# Patient Record
Sex: FEMALE
[De-identification: names, ages, dates, MRNs, and addresses within clinical notes are randomized per-mention and may not be internally consistent; named-entity substitution may affect disease eponyms.]

---

## 2018-01-12 ENCOUNTER — HOSPITAL ENCOUNTER (OUTPATIENT)
Dept: HOSPITAL 5 - CATHLABREC | Age: 38
Discharge: HOME | End: 2018-01-12
Attending: INTERNAL MEDICINE
Payer: COMMERCIAL

## 2018-01-12 VITALS — SYSTOLIC BLOOD PRESSURE: 97 MMHG | DIASTOLIC BLOOD PRESSURE: 53 MMHG

## 2018-01-12 DIAGNOSIS — Z88.5: ICD-10-CM

## 2018-01-12 DIAGNOSIS — Z91.013: ICD-10-CM

## 2018-01-12 DIAGNOSIS — R07.89: Primary | ICD-10-CM

## 2018-01-12 DIAGNOSIS — I10: ICD-10-CM

## 2018-01-12 DIAGNOSIS — Z88.1: ICD-10-CM

## 2018-01-12 DIAGNOSIS — Z91.040: ICD-10-CM

## 2018-01-12 LAB
BAND NEUTROPHILS # (MANUAL): 0 K/MM3
BUN SERPL-MCNC: 14 MG/DL (ref 7–17)
BUN/CREAT SERPL: 28 %
CALCIUM SERPL-MCNC: 9.2 MG/DL (ref 8.4–10.2)
HCT VFR BLD CALC: 36 % (ref 30.3–42.9)
HEMOLYSIS INDEX: 116
HGB BLD-MCNC: 11.8 GM/DL (ref 10.1–14.3)
INR PPP: 0.93 (ref 0.87–1.13)
MCH RBC QN AUTO: 28 PG (ref 28–32)
MCHC RBC AUTO-ENTMCNC: 33 % (ref 30–34)
MCV RBC AUTO: 84 FL (ref 79–97)
MYELOCYTES # (MANUAL): 0 K/MM3
PLATELET # BLD: 292 K/MM3 (ref 140–440)
PROMYELOCYTES # (MANUAL): 0 K/MM3
RBC # BLD AUTO: 4.29 M/MM3 (ref 3.65–5.03)
TOTAL CELLS COUNTED BLD: 100

## 2018-01-12 PROCEDURE — 93010 ELECTROCARDIOGRAM REPORT: CPT

## 2018-01-12 PROCEDURE — 80048 BASIC METABOLIC PNL TOTAL CA: CPT

## 2018-01-12 PROCEDURE — 85007 BL SMEAR W/DIFF WBC COUNT: CPT

## 2018-01-12 PROCEDURE — 99156 MOD SED OTH PHYS/QHP 5/>YRS: CPT

## 2018-01-12 PROCEDURE — 93005 ELECTROCARDIOGRAM TRACING: CPT

## 2018-01-12 PROCEDURE — 85025 COMPLETE CBC W/AUTO DIFF WBC: CPT

## 2018-01-12 PROCEDURE — C1894 INTRO/SHEATH, NON-LASER: HCPCS

## 2018-01-12 PROCEDURE — 93567 NJX CAR CTH SPRVLV AORTGRPHY: CPT

## 2018-01-12 PROCEDURE — 93458 L HRT ARTERY/VENTRICLE ANGIO: CPT

## 2018-01-12 PROCEDURE — 85610 PROTHROMBIN TIME: CPT

## 2018-01-12 PROCEDURE — 99157 MOD SED OTHER PHYS/QHP EA: CPT

## 2018-01-12 PROCEDURE — 36415 COLL VENOUS BLD VENIPUNCTURE: CPT

## 2018-01-12 NOTE — SHORT STAY SUMMARY
Short Stay Documentation


Date of service: 01/12/18





- History


H&P: obtained from office





- Allergies and Medications


Current Medications: 


 Allergies





erythromycin base [From Staticin] Allergy (Verified 01/12/18 08:19)


 Itching


ethyl alcohol [From Staticin] Allergy (Verified 01/12/18 08:19)


 Itching


hydrochlorothiazide Allergy (Verified 01/12/18 08:19)


 Anaphylaxis


latex Allergy (Verified 01/12/18 08:19)


 Itching


shrimp Allergy (Verified 01/12/18 08:19)


 Anaphylaxis





 Home Medications











 Medication  Instructions  Recorded  Confirmed  Last Taken  Type


 


Atenolol [Atenolol] 75 mg PO BID 01/12/18 01/12/18 01/12/18 05:30 History





    75mg 


 


Fat Emul/Soy/Mct/Oliv/Fish Oil 1 tab PO DAILY 01/12/18 01/12/18 01/12/18 05:30 

History





    1 tab 


 


Metformin HCl [Glucophage] 1,000 mg PO BID 01/12/18 01/12/18 01/08/18 History





    1000mg 


 


Multi For Her Tablet 1 tab PO DAILY 01/12/18 01/12/18 01/12/18 History





    1 tab 


 


Nicotine [Nicotine Patch] 14 mg TD DAILY 01/12/18 01/12/18 01/12/18 History





    1 tab 


 


Omeprazole Magnesium [PriLOSEC Otc] 20 mg PO DAILY 01/12/18 01/12/18 01/12/18 

History





    20mg 


 


Prednisone [Prednisone] 50 mg PO ONCE 01/12/18 01/12/18 01/12/18 05:30 History





    50mg 


 


Sertraline [Zoloft] 50 mg PO DAILY 01/12/18 01/12/18 01/12/18 History





    0530 


 


Xanax TAB 0.25 mg PO PRN PRN 01/12/18 01/12/18 01/11/18 History





    1/2 tab 














- Brief post op/procedure progress note


Date of procedure: 01/12/18


Pre-op diagnosis: recurrent chest pain 


Post-op diagnosis: same


Anesthesia: local


Pathology: none


Condition: stable





- Disposition


Condition at discharge: Stable


Disposition: DC-01 TO HOME OR SELFCARE





- Discharge Diagnoses


(1) Chest pain


Status: Chronic   





Short Stay Discharge Plan


Activity: advance as tolerated


Wound: open to air, keep clean and dry, per your surgeon's advice


Follow up with: 


MARCELLUS ROLLINS MD [Primary Care Provider] - 7 Days


Forms:  [employer] PCI D/C Instructions, Work/School Excuse Out Patient, Work/

School Release Form

## 2018-01-12 NOTE — CARDIAC CATHERIZATION REPORT
CARDIAC CATHETERIZATION





REFERRING PHYSICIAN:  Stephan Sharma MD





INDICATION FOR PROCEDURE:  The patient is a very pleasant 38-year-old 

female with recurrent chest pain with typical and atypical features, has had a

stress test, a chest CT ruled out PE.  Stress test is unremarkable for ischemia,

treated with antianginal, still continued to have chest pain on a daily basis. 

The options were discussed with her.  She would like to proceed with cardiac

catheterization.  Risks, benefits, and potential alternatives were explained at

length prior to obtaining the informed consent.





PROCEDURE IN DETAIL:  The patient was brought to catheterization lab in a

post-absorptive state, prepped and draped in sterile fashion.  Pierre's test in

right hand was normal.  A 2 mL of 2% lidocaine was used to anesthetize the right

wrist.  A standard 6-Uzbek hydrophilic sheath was used to cannulate the right

radial artery via modified Seldinger technique.  All exchanges performed to

exchange a J-tip guidewire.  JL3.5 catheter was used to engage the left main

with no dampening or ventricularization.  Cineangiography was performed in all

projections.  JR4 catheter was used to cross the aortic valve under fluoroscopic

guidance.  Left ventriculography performed in 30 SANTIAGO and 30 Faroese projections via

hand injections, catheter flushed.  Manual pullback was performed with

continuous pressure monitoring.  Catheter was used to engage the right coronary

with no dampening or ventricularization.  Cineangiography was performed in all

projections.  JR4 catheter was used to cross the aortic valve under fluoroscopic

guidance.  Left ventriculography performed in 30 SANTIAGO and 30 Faroese projection via

hand injections, catheter flushed.  A Pigtail catheter was used for root

aortography given recurrent chest pain and hypertension.  This was done with a

pigtail catheter in the SANTIAGO projection with a power injector.  Next, catheter

was removed from the body of wire, sheath removed.  Manual pressure used.





DATA:  Aortic pressure is 130/90, LV pressure is 130, LVP of 15 mmHg.  Left

ventriculography revealed normal systolic performance with estimated ejection

fraction of 55-60%.  No aortic stenosis.  Normal LVEDP.





CORONARY ANATOMY:  This is a left dominant system.  Left main is a short vessel

with no significant disease, bifurcates left anterior descending and left

circumflex.  Left circumflex is a large vessel, gives off a left PDA and OM

trunk.  No significant disease.  LAD is a large vessel, courses anterior

intergroove, wraps around the apex, no significant disease in the LAD or

diagonal system.  The right coronary is a small nondominant vessel, no

significant disease.  Three root aortogram was essentially normal contour are

normal caliber, no evidence of penetrating aortic ulcer, no evidence of aortic

dissection, no evidence of aortic insufficiency, normal great vessel anatomy.





CONCLUSIONS:

1.  No angiographic evidence of significant epicardial coronary disease in this

left dominant system.

2.  Normal left ventricular systolic performance with estimated ejection

fraction of 55-60%.

3.  No evidence of aortic stenosis.

4.  Root aortography without evidence of dissection, penetrating ulcer, or

aortic insufficiency.



Recommend risk factor modification, primary and secondary prevention measures. 

Follow up with me in the office.  Follow up to Dr. Champagne, her primary care

physician.  Consider GI evaluation.





DD: 01/12/2018 09:45

DT: 01/12/2018 10:26

JOB# [id number]  [id number]

MICHAEL/JORDI